# Patient Record
Sex: MALE
[De-identification: names, ages, dates, MRNs, and addresses within clinical notes are randomized per-mention and may not be internally consistent; named-entity substitution may affect disease eponyms.]

---

## 2019-03-01 ENCOUNTER — APPOINTMENT (OUTPATIENT)
Dept: INTERNAL MEDICINE | Facility: CLINIC | Age: 29
End: 2019-03-01
Payer: COMMERCIAL

## 2019-03-01 VITALS
SYSTOLIC BLOOD PRESSURE: 109 MMHG | OXYGEN SATURATION: 98 % | TEMPERATURE: 98 F | DIASTOLIC BLOOD PRESSURE: 70 MMHG | HEIGHT: 69 IN | HEART RATE: 84 BPM | BODY MASS INDEX: 16.44 KG/M2 | WEIGHT: 111 LBS

## 2019-03-01 DIAGNOSIS — Z23 ENCOUNTER FOR IMMUNIZATION: ICD-10-CM

## 2019-03-01 DIAGNOSIS — K64.4 RESIDUAL HEMORRHOIDAL SKIN TAGS: ICD-10-CM

## 2019-03-01 DIAGNOSIS — Z11.3 ENCOUNTER FOR SCREENING FOR INFECTIONS WITH A PREDOMINANTLY SEXUAL MODE OF TRANSMISSION: ICD-10-CM

## 2019-03-01 DIAGNOSIS — Z00.00 ENCOUNTER FOR GENERAL ADULT MEDICAL EXAMINATION W/OUT ABNORMAL FINDINGS: ICD-10-CM

## 2019-03-01 DIAGNOSIS — Z98.890 OTHER SPECIFIED POSTPROCEDURAL STATES: ICD-10-CM

## 2019-03-01 DIAGNOSIS — Z80.8 FAMILY HISTORY OF MALIGNANT NEOPLASM OF OTHER ORGANS OR SYSTEMS: ICD-10-CM

## 2019-03-01 DIAGNOSIS — Z78.9 OTHER SPECIFIED HEALTH STATUS: ICD-10-CM

## 2019-03-01 DIAGNOSIS — Z83.438 FAMILY HISTORY OF OTHER DISORDER OF LIPOPROTEIN METABOLISM AND OTHER LIPIDEMIA: ICD-10-CM

## 2019-03-01 DIAGNOSIS — Z87.440 PERSONAL HISTORY OF URINARY (TRACT) INFECTIONS: ICD-10-CM

## 2019-03-01 DIAGNOSIS — Z86.19 PERSONAL HISTORY OF OTHER INFECTIOUS AND PARASITIC DISEASES: ICD-10-CM

## 2019-03-01 PROCEDURE — 90686 IIV4 VACC NO PRSV 0.5 ML IM: CPT

## 2019-03-01 PROCEDURE — G0008: CPT

## 2019-03-01 PROCEDURE — 36415 COLL VENOUS BLD VENIPUNCTURE: CPT

## 2019-03-01 PROCEDURE — 99385 PREV VISIT NEW AGE 18-39: CPT | Mod: 25

## 2019-03-01 NOTE — HISTORY OF PRESENT ILLNESS
[FreeTextEntry1] : est care, annual exam [de-identified] : STD screening\par - sexually active, h/o gonorrhea and ? genital warts\par - would like to resume PreP once screening labs returned\par \par abdominal pain\par - right flank/RUQ pain, CT abdomen was normal\par - pain radiated up chest and flank, described as burning\par - was evaluated by GI, underwent colonoscopy bx results pending\par \par HCM\par - need flu vaccine

## 2019-03-01 NOTE — HEALTH RISK ASSESSMENT
[Excellent] : ~his/her~  mood as  excellent [] : No [No falls in past year] : Patient reported no falls in the past year [0] : 2) Feeling down, depressed, or hopeless: Not at all (0) [[id number]] : 0 [Change in mental status noted] : No change in mental status noted [Language] : denies difficulty with language [Behavior] : denies difficulty with behavior [Learning/Retaining New Information] : denies difficulty learning/retaining new information [Handling Complex Tasks] : denies difficulty handling complex tasks [Reasoning] : denies difficulty with reasoning [Spatial Ability and Orientation] : denies difficulty with spatial ability and orientation [None] : None [Alone] : lives alone [Employed] : employed [] :  [# Of Children ___] : has [unfilled] children [Sexually Active] : sexually active [High Risk Behavior] : high risk behavior [Fully functional (bathing, dressing, toileting, transferring, walking, feeding)] : Fully functional (bathing, dressing, toileting, transferring, walking, feeding) [Fully functional (using the telephone, shopping, preparing meals, housekeeping, doing laundry, using] : Fully functional and needs no help or supervision to perform IADLs (using the telephone, shopping, preparing meals, housekeeping, doing laundry, using transportation, managing medications and managing finances) [Reports changes in hearing] : Reports no changes in hearing [Reports changes in vision] : Reports no changes in vision [Reports normal functional visual acuity (ie: able to read med bottle)] : Reports normal functional visual acuity [With Patient/Caregiver] : With Patient/Caregiver [Designated Healthcare Proxy] : Designated healthcare proxy [AdvancecareDate] : 03/01/2019

## 2019-03-04 ENCOUNTER — TRANSCRIPTION ENCOUNTER (OUTPATIENT)
Age: 29
End: 2019-03-04

## 2019-03-05 LAB
25(OH)D3 SERPL-MCNC: 12 NG/ML
ALBUMIN SERPL ELPH-MCNC: 5.1 G/DL
ALP BLD-CCNC: 83 U/L
ALT SERPL-CCNC: 16 U/L
ANION GAP SERPL CALC-SCNC: 12 MMOL/L
APPEARANCE: CLEAR
AST SERPL-CCNC: 26 U/L
BASOPHILS # BLD AUTO: 0.06 K/UL
BASOPHILS NFR BLD AUTO: 1.3 %
BILIRUB SERPL-MCNC: 0.9 MG/DL
BILIRUBIN URINE: NEGATIVE
BLOOD URINE: NEGATIVE
BUN SERPL-MCNC: 12 MG/DL
C TRACH RRNA SPEC QL NAA+PROBE: NOT DETECTED
CALCIUM SERPL-MCNC: 9.8 MG/DL
CHLORIDE SERPL-SCNC: 103 MMOL/L
CHOLEST SERPL-MCNC: 163 MG/DL
CHOLEST/HDLC SERPL: 2.4 RATIO
CO2 SERPL-SCNC: 28 MMOL/L
COLOR: YELLOW
CREAT SERPL-MCNC: 0.83 MG/DL
EOSINOPHIL # BLD AUTO: 0.4 K/UL
EOSINOPHIL NFR BLD AUTO: 9 %
GLUCOSE QUALITATIVE U: NEGATIVE
GLUCOSE SERPL-MCNC: 74 MG/DL
HBA1C MFR BLD HPLC: 5.2 %
HCT VFR BLD CALC: 48.5 %
HCV AB SER QL: NONREACTIVE
HCV S/CO RATIO: 0.08 S/CO
HDLC SERPL-MCNC: 67 MG/DL
HGB BLD-MCNC: 14.6 G/DL
HIV1+2 AB SPEC QL IA.RAPID: NONREACTIVE
HSV 1+2 IGG SER IA-IMP: NEGATIVE
HSV 1+2 IGG SER IA-IMP: NEGATIVE
HSV1 IGG SER QL: <0.01 INDEX
HSV2 IGG SER QL: 0.08 INDEX
IMM GRANULOCYTES NFR BLD AUTO: 0.4 %
KETONES URINE: NEGATIVE
LDLC SERPL CALC-MCNC: 88 MG/DL
LEUKOCYTE ESTERASE URINE: NEGATIVE
LYMPHOCYTES # BLD AUTO: 1.26 K/UL
LYMPHOCYTES NFR BLD AUTO: 28.3 %
MAN DIFF?: NORMAL
MCHC RBC-ENTMCNC: 26.3 PG
MCHC RBC-ENTMCNC: 30.1 GM/DL
MCV RBC AUTO: 87.2 FL
MONOCYTES # BLD AUTO: 0.38 K/UL
MONOCYTES NFR BLD AUTO: 8.5 %
N GONORRHOEA RRNA SPEC QL NAA+PROBE: NOT DETECTED
NEUTROPHILS # BLD AUTO: 2.34 K/UL
NEUTROPHILS NFR BLD AUTO: 52.5 %
NITRITE URINE: NEGATIVE
PH URINE: 7.5
PLATELET # BLD AUTO: 235 K/UL
POTASSIUM SERPL-SCNC: 4.6 MMOL/L
PROT SERPL-MCNC: 7 G/DL
PROTEIN URINE: NEGATIVE
PSA SERPL-MCNC: 0.52 NG/ML
RBC # BLD: 5.56 M/UL
RBC # FLD: 12.8 %
SODIUM SERPL-SCNC: 143 MMOL/L
SOURCE AMPLIFICATION: NORMAL
SPECIFIC GRAVITY URINE: 1.02
T PALLIDUM AB SER QL IA: NEGATIVE
T4 FREE SERPL-MCNC: 1.4 NG/DL
TRIGL SERPL-MCNC: 42 MG/DL
TSH SERPL-ACNC: 1.72 UIU/ML
UROBILINOGEN URINE: NORMAL
WBC # FLD AUTO: 4.46 K/UL

## 2019-04-22 ENCOUNTER — APPOINTMENT (OUTPATIENT)
Dept: INTERNAL MEDICINE | Facility: CLINIC | Age: 29
End: 2019-04-22
Payer: MEDICAID

## 2019-04-22 VITALS
DIASTOLIC BLOOD PRESSURE: 70 MMHG | WEIGHT: 112 LBS | SYSTOLIC BLOOD PRESSURE: 102 MMHG | TEMPERATURE: 97.7 F | OXYGEN SATURATION: 99 % | HEART RATE: 83 BPM | HEIGHT: 69 IN | BODY MASS INDEX: 16.59 KG/M2

## 2019-04-22 DIAGNOSIS — Z11.3 ENCOUNTER FOR SCREENING FOR INFECTIONS WITH A PREDOMINANTLY SEXUAL MODE OF TRANSMISSION: ICD-10-CM

## 2019-04-22 DIAGNOSIS — E55.9 VITAMIN D DEFICIENCY, UNSPECIFIED: ICD-10-CM

## 2019-04-22 DIAGNOSIS — J45.30 MILD PERSISTENT ASTHMA, UNCOMPLICATED: ICD-10-CM

## 2019-04-22 DIAGNOSIS — R51 HEADACHE: ICD-10-CM

## 2019-04-22 PROCEDURE — 99213 OFFICE O/P EST LOW 20 MIN: CPT | Mod: 25

## 2019-04-22 PROCEDURE — 36415 COLL VENOUS BLD VENIPUNCTURE: CPT

## 2019-04-22 NOTE — HISTORY OF PRESENT ILLNESS
[FreeTextEntry1] : follow up [de-identified] : concerned will lose insurance this month\par - needs medication refills\par \par PreP\par - needs STD screening and refill\par \par Vit D def\par - needs refill\par \par scalp pain\par - over vertex of scalp feels like something inside\par - h/o trauma x 2 over the past year, bumped head on freezer door and on cabinet door\par - no lesions on scalp no change in vision or migraines

## 2019-04-22 NOTE — PHYSICAL EXAM
[No Acute Distress] : no acute distress [Well Developed] : well developed [Well Nourished] : well nourished [Well-Appearing] : well-appearing [EOMI] : extraocular movements intact [Normal Sclera/Conjunctiva] : normal sclera/conjunctiva [No Respiratory Distress] : no respiratory distress  [Normal Rate] : normal rate  [Normal Gait] : normal gait [Normal Affect] : the affect was normal [Normal Mood] : the mood was normal [Alert and Oriented x3] : oriented to person, place, and time [Normal Insight/Judgement] : insight and judgment were intact [de-identified] : scalp normal on exam

## 2019-04-22 NOTE — HEALTH RISK ASSESSMENT
[] : No [No falls in past year] : Patient reported no falls in the past year [0] : 2) Feeling down, depressed, or hopeless: Not at all (0) [[id number]] : 0

## 2019-04-24 LAB
25(OH)D3 SERPL-MCNC: 50.8 NG/ML
C TRACH RRNA SPEC QL NAA+PROBE: NOT DETECTED
HIV1+2 AB SPEC QL IA.RAPID: NONREACTIVE
HSV 1+2 IGG SER IA-IMP: NEGATIVE
HSV 1+2 IGG SER IA-IMP: NEGATIVE
HSV1 IGG SER QL: <0.01 INDEX
HSV2 IGG SER QL: 0.11 INDEX
N GONORRHOEA RRNA SPEC QL NAA+PROBE: NOT DETECTED
SOURCE AMPLIFICATION: NORMAL
T PALLIDUM AB SER QL IA: NEGATIVE
VIT B12 SERPL-MCNC: 597 PG/ML

## 2019-04-28 ENCOUNTER — FORM ENCOUNTER (OUTPATIENT)
Age: 29
End: 2019-04-28

## 2019-04-29 ENCOUNTER — APPOINTMENT (OUTPATIENT)
Dept: MRI IMAGING | Facility: CLINIC | Age: 29
End: 2019-04-29
Payer: MEDICAID

## 2019-04-29 ENCOUNTER — OUTPATIENT (OUTPATIENT)
Dept: OUTPATIENT SERVICES | Facility: HOSPITAL | Age: 29
LOS: 1 days | End: 2019-04-29

## 2019-04-29 PROCEDURE — 70551 MRI BRAIN STEM W/O DYE: CPT | Mod: 26

## 2019-05-06 ENCOUNTER — MEDICATION RENEWAL (OUTPATIENT)
Age: 29
End: 2019-05-06

## 2019-05-06 RX ORDER — ERGOCALCIFEROL 1.25 MG/1
1.25 MG CAPSULE, LIQUID FILLED ORAL
Qty: 12 | Refills: 3 | Status: ACTIVE | COMMUNITY
Start: 2019-03-05 | End: 1900-01-01

## 2019-05-06 RX ORDER — ALBUTEROL SULFATE 90 UG/1
108 (90 BASE) AEROSOL, METERED RESPIRATORY (INHALATION)
Qty: 3 | Refills: 3 | Status: ACTIVE | COMMUNITY
Start: 1900-01-01 | End: 1900-01-01

## 2019-07-18 ENCOUNTER — APPOINTMENT (OUTPATIENT)
Dept: INTERNAL MEDICINE | Facility: CLINIC | Age: 29
End: 2019-07-18
Payer: MEDICAID

## 2019-07-18 VITALS
OXYGEN SATURATION: 98 % | SYSTOLIC BLOOD PRESSURE: 102 MMHG | DIASTOLIC BLOOD PRESSURE: 70 MMHG | WEIGHT: 112 LBS | BODY MASS INDEX: 16.59 KG/M2 | HEIGHT: 69 IN | TEMPERATURE: 98 F | HEART RATE: 81 BPM

## 2019-07-18 DIAGNOSIS — Z72.51 HIGH RISK HETEROSEXUAL BEHAVIOR: ICD-10-CM

## 2019-07-18 DIAGNOSIS — R30.0 DYSURIA: ICD-10-CM

## 2019-07-18 PROCEDURE — 99214 OFFICE O/P EST MOD 30 MIN: CPT | Mod: 25

## 2019-07-18 PROCEDURE — 36415 COLL VENOUS BLD VENIPUNCTURE: CPT

## 2019-07-18 NOTE — PHYSICAL EXAM
[No Acute Distress] : no acute distress [Well Nourished] : well nourished [Well Developed] : well developed [No Respiratory Distress] : no respiratory distress  [de-identified] : left trapezius spasm, decreased internal and external rotation of left shoulder, ? laxity of shoulder joint w/ external pressure.

## 2019-07-18 NOTE — HISTORY OF PRESENT ILLNESS
[FreeTextEntry8] : left shoulder pain\par - believes he injured it while duing push ups\par - pain radiates up his neck and down his left arm\par - left arm is not as flexible as right arm\par - pt is very flexible at baseline\par - has not tried any OTC medications for pain\par - has never had imaging\par \par Dysuria\par - c/o urethral discomfort\par - woke him up from sleep\par - no meatal discharge, fever or chills\par - compliant w/ truvada, needs screening\par - concerned about long term side effects from medication.

## 2019-07-19 ENCOUNTER — MEDICATION RENEWAL (OUTPATIENT)
Age: 29
End: 2019-07-19

## 2019-07-19 RX ORDER — EMTRICITABINE AND TENOFOVIR DISOPROXIL FUMARATE 200; 300 MG/1; MG/1
200-300 TABLET, FILM COATED ORAL DAILY
Qty: 30 | Refills: 3 | Status: ACTIVE | COMMUNITY
Start: 2019-03-05 | End: 1900-01-01

## 2019-07-22 LAB
APPEARANCE: CLEAR
BACTERIA UR CULT: NORMAL
BILIRUBIN URINE: NEGATIVE
BLOOD URINE: NEGATIVE
C TRACH RRNA SPEC QL NAA+PROBE: NOT DETECTED
COLOR: YELLOW
GLUCOSE QUALITATIVE U: NEGATIVE
HIV1+2 AB SPEC QL IA.RAPID: NONREACTIVE
KETONES URINE: NORMAL
LEUKOCYTE ESTERASE URINE: NEGATIVE
N GONORRHOEA RRNA SPEC QL NAA+PROBE: NOT DETECTED
NITRITE URINE: NEGATIVE
PH URINE: 6.5
PROTEIN URINE: NORMAL
SOURCE AMPLIFICATION: NORMAL
SPECIFIC GRAVITY URINE: 1.03
T PALLIDUM AB SER QL IA: NEGATIVE
UROBILINOGEN URINE: NORMAL

## 2019-07-24 ENCOUNTER — FORM ENCOUNTER (OUTPATIENT)
Age: 29
End: 2019-07-24

## 2019-07-24 ENCOUNTER — OUTPATIENT (OUTPATIENT)
Dept: OUTPATIENT SERVICES | Facility: HOSPITAL | Age: 29
LOS: 1 days | End: 2019-07-24

## 2019-07-24 ENCOUNTER — APPOINTMENT (OUTPATIENT)
Dept: MRI IMAGING | Facility: CLINIC | Age: 29
End: 2019-07-24
Payer: MEDICAID

## 2019-07-25 PROCEDURE — 73221 MRI JOINT UPR EXTREM W/O DYE: CPT | Mod: 26,LT

## 2019-08-15 ENCOUNTER — LABORATORY RESULT (OUTPATIENT)
Age: 29
End: 2019-08-15

## 2019-08-15 ENCOUNTER — MEDICATION RENEWAL (OUTPATIENT)
Age: 29
End: 2019-08-15

## 2019-08-15 ENCOUNTER — APPOINTMENT (OUTPATIENT)
Dept: INTERNAL MEDICINE | Facility: CLINIC | Age: 29
End: 2019-08-15
Payer: MEDICAID

## 2019-08-15 VITALS
BODY MASS INDEX: 16.39 KG/M2 | DIASTOLIC BLOOD PRESSURE: 71 MMHG | SYSTOLIC BLOOD PRESSURE: 106 MMHG | WEIGHT: 111 LBS | HEART RATE: 113 BPM | OXYGEN SATURATION: 98 %

## 2019-08-15 DIAGNOSIS — M25.512 PAIN IN LEFT SHOULDER: ICD-10-CM

## 2019-08-15 PROCEDURE — 99214 OFFICE O/P EST MOD 30 MIN: CPT | Mod: 25

## 2019-08-15 PROCEDURE — 36415 COLL VENOUS BLD VENIPUNCTURE: CPT

## 2019-08-15 RX ORDER — MELOXICAM 15 MG/1
15 TABLET ORAL
Qty: 30 | Refills: 0 | Status: ACTIVE | COMMUNITY
Start: 2019-07-18 | End: 1900-01-01

## 2019-08-15 NOTE — HISTORY OF PRESENT ILLNESS
[FreeTextEntry1] : joint pains [de-identified] : joint pains\par - c/o continued left shoulder pain\par - MRI left shoulder normal 7/25/19\par - also noted b/l Achilles tendon pain at heel insertion\par - no wrist or finger pains\par - right shoulder started to bother him as well, may be from favoring it over left shoulder\par - mother has fibromyalgia, concerned he may have it also.

## 2019-08-15 NOTE — PHYSICAL EXAM
[No Acute Distress] : no acute distress [Well Nourished] : well nourished [Well Developed] : well developed [EOMI] : extraocular movements intact [No Respiratory Distress] : no respiratory distress  [No Rash] : no rash [No Edema] : there was no peripheral edema [Alert and Oriented x3] : oriented to person, place, and time [Normal Affect] : the affect was normal [Normal Mood] : the mood was normal [Normal Insight/Judgement] : insight and judgment were intact [de-identified] : b/l tenderness over achilles tendon insertion into heel

## 2019-08-16 ENCOUNTER — TRANSCRIPTION ENCOUNTER (OUTPATIENT)
Age: 29
End: 2019-08-16

## 2019-08-16 LAB
CK SERPL-CCNC: 108 U/L
ERYTHROCYTE [SEDIMENTATION RATE] IN BLOOD BY WESTERGREN METHOD: 2 MM/HR
RHEUMATOID FACT SER QL: 245 IU/ML
URATE SERPL-MCNC: 4.5 MG/DL

## 2019-08-19 LAB
CCP AB SER IA-ACNC: 16 UNITS
MPO AB + PR3 PNL SER: NORMAL
RF+CCP IGG SER-IMP: NEGATIVE

## 2019-08-20 LAB — ANA SER IF-ACNC: NEGATIVE

## 2019-08-21 LAB — ALDOLASE SERPL-CCNC: 6.5 U/L

## 2019-08-23 ENCOUNTER — APPOINTMENT (OUTPATIENT)
Dept: RHEUMATOLOGY | Facility: CLINIC | Age: 29
End: 2019-08-23
Payer: MEDICAID

## 2019-08-23 ENCOUNTER — OUTPATIENT (OUTPATIENT)
Dept: OUTPATIENT SERVICES | Facility: HOSPITAL | Age: 29
LOS: 1 days | End: 2019-08-23

## 2019-08-23 ENCOUNTER — LABORATORY RESULT (OUTPATIENT)
Age: 29
End: 2019-08-23

## 2019-08-23 ENCOUNTER — APPOINTMENT (OUTPATIENT)
Dept: RADIOLOGY | Facility: CLINIC | Age: 29
End: 2019-08-23
Payer: MEDICAID

## 2019-08-23 VITALS
HEART RATE: 84 BPM | WEIGHT: 111 LBS | OXYGEN SATURATION: 99 % | BODY MASS INDEX: 16.44 KG/M2 | DIASTOLIC BLOOD PRESSURE: 82 MMHG | HEIGHT: 69 IN | TEMPERATURE: 98.1 F | SYSTOLIC BLOOD PRESSURE: 115 MMHG

## 2019-08-23 DIAGNOSIS — R53.83 OTHER FATIGUE: ICD-10-CM

## 2019-08-23 PROCEDURE — 73130 X-RAY EXAM OF HAND: CPT | Mod: 26,LT,RT

## 2019-08-23 PROCEDURE — 73110 X-RAY EXAM OF WRIST: CPT | Mod: 26,LT,RT

## 2019-08-23 PROCEDURE — 99205 OFFICE O/P NEW HI 60 MIN: CPT | Mod: 25

## 2019-08-23 PROCEDURE — 36415 COLL VENOUS BLD VENIPUNCTURE: CPT

## 2019-08-25 LAB
B BURGDOR AB SER-IMP: NEGATIVE
B BURGDOR IGG+IGM SER QL: 0.11 INDEX
CRP SERPL-MCNC: <0.1 MG/DL
ENDOMYSIUM IGA SER QL: NEGATIVE
ENDOMYSIUM IGA TITR SER: NORMAL
FERRITIN SERPL-MCNC: 185 NG/ML
IGA SER QL IEP: 131 MG/DL

## 2019-08-26 LAB
A PHAGOCYTOPH IGG TITR SER IF: NORMAL TITER
ALBUMIN MFR SERPL ELPH: 66.8 %
ALBUMIN SERPL-MCNC: 4.8 G/DL
ALBUMIN/GLOB SERPL: 2 RATIO
ALBUPE: 8.9 %
ALPHA1 GLOB MFR SERPL ELPH: 3.1 %
ALPHA1 GLOB SERPL ELPH-MCNC: 0.2 G/DL
ALPHA1UPE: 23.1 %
ALPHA2 GLOB MFR SERPL ELPH: 8.9 %
ALPHA2 GLOB SERPL ELPH-MCNC: 0.6 G/DL
ALPHA2UPE: 15.1 %
B BURGDOR AB SER QL IA: NEGATIVE
B MICROTI IGG TITR SER: NORMAL TITER
B-GLOBULIN MFR SERPL ELPH: 9.6 %
B-GLOBULIN SERPL ELPH-MCNC: 0.7 G/DL
BETAUPE: 11 %
DEPRECATED KAPPA LC FREE/LAMBDA SER: 1.12 RATIO
E CHAFFEENSIS IGG TITR SER IF: NORMAL TITER
GAMMA GLOB FLD ELPH-MCNC: 0.8 G/DL
GAMMA GLOB MFR SERPL ELPH: 11.6 %
GAMMAUPE: 41.9 %
IGA 24H UR QL IFE: NORMAL
IGA SER QL IEP: 133 MG/DL
IGG SER QL IEP: 800 MG/DL
IGM SER QL IEP: 206 MG/DL
INTERPRETATION SERPL IEP-IMP: NORMAL
KAPPA LC 24H UR QL: NORMAL
KAPPA LC CSF-MCNC: 0.73 MG/DL
KAPPA LC SERPL-MCNC: 0.82 MG/DL
M PROTEIN SPEC IFE-MCNC: NORMAL
PROT PATTERN 24H UR ELPH-IMP: NORMAL
PROT SERPL-MCNC: 7.2 G/DL
PROT SERPL-MCNC: 7.2 G/DL
PROT UR-MCNC: 5 MG/DL
PROT UR-MCNC: 6 MG/DL

## 2019-08-26 NOTE — ASSESSMENT
[FreeTextEntry1] : 27 y/o M with anxiety and asthma,   b/l shoulder pain feels more burning sensation , recent shoulder MRI normal,  knee pain since childhood,hypermobile joints since childhood without dislocation of the joints  no easy bruising or skin laxity, Treva's tendinitis  presents for Rheum consultation as recently found strongly positive RF,  exam except hypertensive elbows no evidence of small or large joint synovitis or limited ROM, no AM stiffness or skin nodules, \par At present low suspicious for RA or any underlying autoimmune disease. \par I explained pt that RF is the non specific test and \par \par 1. will check additional labs such as CRP, tick disease serology, SPEP/UPEP with IF , celiac serology \par 2. Xrays of hand/wrist and US looking for underlying active synovitis, tenosynovitis or erosions if negative work up most likely will monitor pt once a year or prn and watch for developing specific RA symptoms such as small or large joint swelling or stiffness. \par \par f/u in 2 weeks to review labs and images.

## 2019-08-26 NOTE — HISTORY OF PRESENT ILLNESS
[Fatigue] : fatigue [Arthralgias] : arthralgias [Difficulty Walking] : difficulty walking [Myalgias] : myalgias [Muscle Spasms] : muscle spasms [FreeTextEntry1] : Pt referred by Dr. Herron for Rheumatology consultation \par 27 y/o M with anxiety and asthma,   b/l shoulder pain feels more burning sensation , recent shoulder MRI normal,  knee pain since childhood,hypermobile joints since childhood without dislocation of the joints  no easy bruising or skin laxity, Treva's tendinitis 2016 and it took 1 year to improve. He was a dancer  stopped\par 2 years ago because of tendinitis,  b/l sudden onset hand pain for 2-3 weeks and resolved on it's own.\par Takes Meloxicam for pain control.  \par AD since childhood. \par Insomnia. \par Mother with fibromyalgia \par Denies low back pain. \par Mild gastritis: had EGD and colonoscopy.  \par He was found to have strongly +  with normal inflammatory markers and normal CRP. \par No h/o join swelling , morning stiffness, memory loss, patchy hair loss, sicca symptoms, photosensitivity, HA,  Raynaud's, oral ulcers, nasal ulcers. \par Personal or family hx of autoimmune disease, no hx of psoriasis\par  [Anorexia] : no anorexia [Weight Loss] : no weight loss [Malaise] : no malaise [Fever] : no fever [Chills] : no chills [Depression] : no depression [Malar Facial Rash] : no malar facial rash [Skin Lesions] : no lesions [Skin Nodules] : no skin nodules [Oral Ulcers] : no oral ulcers [Cough] : no cough [Dry Mouth] : no dry mouth [Dysphonia] : no dysphonia [Dysphagia] : no dysphagia [Chest Pain] : no chest pain [Shortness of Breath] : no shortness of breath [Joint Swelling] : no joint swelling [Joint Warmth] : no joint warmth [Joint Deformity] : no joint deformity [Decreased ROM] : no decreased range of motion [Morning Stiffness] : no morning stiffness [Falls] : no falls [Difficulty Standing] : no difficulty standing [Dyspnea] : no dyspnea [Muscle Weakness] : no muscle weakness [Visual Changes] : no visual changes [Muscle Cramping] : no muscle cramping [Eye Pain] : no eye pain [Eye Redness] : no eye redness [Dry Eyes] : no dry eyes

## 2019-08-26 NOTE — PHYSICAL EXAM
[General Appearance - In No Acute Distress] : in no acute distress [General Appearance - Alert] : alert [General Appearance - Well Nourished] : well nourished [General Appearance - Well-Appearing] : healthy appearing [General Appearance - Well Developed] : well developed [Sclera] : the sclera and conjunctiva were normal [PERRL With Normal Accommodation] : pupils were equal in size, round, and reactive to light [Examination Of The Oral Cavity] : the lips and gums were normal [Outer Ear] : the ears and nose were normal in appearance [Oropharynx] : the oropharynx was normal [Neck Appearance] : the appearance of the neck was normal [Thyroid Diffuse Enlargement] : the thyroid was not enlarged [Jugular Venous Distention Increased] : there was no jugular-venous distention [Neck Cervical Mass (___cm)] : no neck mass was observed [Auscultation Breath Sounds / Voice Sounds] : lungs were clear to auscultation bilaterally [Respiration, Rhythm And Depth] : normal respiratory rhythm and effort [Heart Rate And Rhythm] : heart rate was normal and rhythm regular [Heart Sounds] : normal S1 and S2 [Murmurs] : no murmurs [Arterial Pulses Carotid] : carotid pulses were normal with no bruits [Heart Sounds Pericardial Friction Rub] : no pericardial rub [Edema] : there was no peripheral edema [Abdomen Soft] : soft [Veins - Varicosity Changes] : there were no varicosital changes [Abdomen Tenderness] : non-tender [Cervical Lymph Nodes Enlarged Posterior Bilaterally] : posterior cervical [Cervical Lymph Nodes Enlarged Anterior Bilaterally] : anterior cervical [No CVA Tenderness] : no ~M costovertebral angle tenderness [Abnormal Walk] : normal gait [Nail Clubbing] : no clubbing  or cyanosis of the fingernails [Musculoskeletal - Swelling] : no joint swelling seen [Motor Tone] : muscle strength and tone were normal [Skin Color & Pigmentation] : normal skin color and pigmentation [Skin Turgor] : normal skin turgor [] : no rash [Skin Lesions] : no skin lesions [Motor Exam] : the motor exam was normal [Oriented To Time, Place, And Person] : oriented to person, place, and time [Affect] : the affect was normal [Mood] : the mood was normal [FreeTextEntry1] : b/l elbow with hyperextension. mild b/l tender shoulder without swelling. no other joint tenderenss or synovitis, FROM. unable to extend of the thumb to touch the flexor aspect of the forearm

## 2019-08-26 NOTE — REVIEW OF SYSTEMS
[Feeling Tired] : feeling tired [Sore Throat] : sore throat [Arthralgias] : arthralgias [Joint Pain] : joint pain [Depression] : depression [Anxiety] : anxiety [Fever] : no fever [Chills] : no chills [Feeling Poorly] : not feeling poorly [Eye Pain] : no eye pain [Red Eyes] : eyes not red [Loss Of Hearing] : no hearing loss [Earache] : no earache [Hoarseness] : no hoarseness [Heart Rate Is Slow] : the heart rate was not slow [Heart Rate Is Fast] : the heart rate was not fast [Chest Pain] : no chest pain [Palpitations] : no palpitations [Cough] : no cough [SOB on Exertion] : no shortness of breath during exertion [Heartburn] : no heartburn [Abdominal Pain] : no abdominal pain [Melena] : no melena [Skin Lesions] : no skin lesions [Skin Wound] : no skin wound [Itching] : no itching [Change In A Mole] : no change in a mole [Dizziness] : no dizziness [Fainting] : no fainting [Muscle Weakness] : no muscle weakness [Easy Bleeding] : no tendency for easy bleeding [Easy Bruising] : no tendency for easy bruising [Swollen Glands] : no swollen glands [Swollen Glands In The Neck] : no swollen glands in the neck

## 2019-08-27 LAB
TTG IGA SER IA-ACNC: <1.2 U/ML
TTG IGA SER-ACNC: NEGATIVE
TTG IGG SER IA-ACNC: <1.2 U/ML
TTG IGG SER IA-ACNC: NEGATIVE

## 2019-09-06 LAB

## 2019-09-12 ENCOUNTER — APPOINTMENT (OUTPATIENT)
Dept: ULTRASOUND IMAGING | Facility: CLINIC | Age: 29
End: 2019-09-12
Payer: MEDICAID

## 2019-09-12 ENCOUNTER — OUTPATIENT (OUTPATIENT)
Dept: OUTPATIENT SERVICES | Facility: HOSPITAL | Age: 29
LOS: 1 days | End: 2019-09-12

## 2019-09-12 PROCEDURE — 76881 US COMPL JOINT R-T W/IMG: CPT | Mod: 26,RT

## 2019-09-12 PROCEDURE — 76881 US COMPL JOINT R-T W/IMG: CPT | Mod: 26,LT,76

## 2019-09-17 ENCOUNTER — APPOINTMENT (OUTPATIENT)
Dept: RHEUMATOLOGY | Facility: CLINIC | Age: 29
End: 2019-09-17
Payer: MEDICAID

## 2019-09-17 ENCOUNTER — APPOINTMENT (OUTPATIENT)
Dept: RADIOLOGY | Facility: CLINIC | Age: 29
End: 2019-09-17
Payer: MEDICAID

## 2019-09-17 ENCOUNTER — OUTPATIENT (OUTPATIENT)
Dept: OUTPATIENT SERVICES | Facility: HOSPITAL | Age: 29
LOS: 1 days | End: 2019-09-17

## 2019-09-17 VITALS
SYSTOLIC BLOOD PRESSURE: 104 MMHG | OXYGEN SATURATION: 100 % | TEMPERATURE: 98.3 F | DIASTOLIC BLOOD PRESSURE: 69 MMHG | WEIGHT: 111 LBS | HEART RATE: 66 BPM | HEIGHT: 69 IN | BODY MASS INDEX: 16.44 KG/M2

## 2019-09-17 DIAGNOSIS — M25.50 PAIN IN UNSPECIFIED JOINT: ICD-10-CM

## 2019-09-17 DIAGNOSIS — R76.8 OTHER SPECIFIED ABNORMAL IMMUNOLOGICAL FINDINGS IN SERUM: ICD-10-CM

## 2019-09-17 DIAGNOSIS — G89.29 PAIN IN RIGHT FOOT: ICD-10-CM

## 2019-09-17 DIAGNOSIS — M79.671 PAIN IN RIGHT FOOT: ICD-10-CM

## 2019-09-17 DIAGNOSIS — M79.672 PAIN IN RIGHT FOOT: ICD-10-CM

## 2019-09-17 PROCEDURE — 99214 OFFICE O/P EST MOD 30 MIN: CPT

## 2019-09-17 PROCEDURE — 73630 X-RAY EXAM OF FOOT: CPT | Mod: 26,LT,RT

## 2019-09-17 NOTE — PHYSICAL EXAM
[General Appearance - In No Acute Distress] : in no acute distress [General Appearance - Alert] : alert [General Appearance - Well Developed] : well developed [General Appearance - Well Nourished] : well nourished [Sclera] : the sclera and conjunctiva were normal [General Appearance - Well-Appearing] : healthy appearing [PERRL With Normal Accommodation] : pupils were equal in size, round, and reactive to light [Outer Ear] : the ears and nose were normal in appearance [Neck Appearance] : the appearance of the neck was normal [Oropharynx] : the oropharynx was normal [Examination Of The Oral Cavity] : the lips and gums were normal [Neck Cervical Mass (___cm)] : no neck mass was observed [Jugular Venous Distention Increased] : there was no jugular-venous distention [Thyroid Diffuse Enlargement] : the thyroid was not enlarged [Respiration, Rhythm And Depth] : normal respiratory rhythm and effort [Auscultation Breath Sounds / Voice Sounds] : lungs were clear to auscultation bilaterally [Heart Sounds] : normal S1 and S2 [Heart Rate And Rhythm] : heart rate was normal and rhythm regular [Murmurs] : no murmurs [Heart Sounds Pericardial Friction Rub] : no pericardial rub [Arterial Pulses Carotid] : carotid pulses were normal with no bruits [Edema] : there was no peripheral edema [Abdomen Soft] : soft [Veins - Varicosity Changes] : there were no varicosital changes [Cervical Lymph Nodes Enlarged Posterior Bilaterally] : posterior cervical [Abdomen Tenderness] : non-tender [Cervical Lymph Nodes Enlarged Anterior Bilaterally] : anterior cervical [No CVA Tenderness] : no ~M costovertebral angle tenderness [Abnormal Walk] : normal gait [Musculoskeletal - Swelling] : no joint swelling seen [Nail Clubbing] : no clubbing  or cyanosis of the fingernails [Motor Tone] : muscle strength and tone were normal [Skin Color & Pigmentation] : normal skin color and pigmentation [] : no rash [Skin Turgor] : normal skin turgor [Motor Exam] : the motor exam was normal [Skin Lesions] : no skin lesions [Mood] : the mood was normal [Oriented To Time, Place, And Person] : oriented to person, place, and time [Affect] : the affect was normal [FreeTextEntry1] : b/l elbow with hyperextension. mild b/l tender shoulder without swelling. no other joint tenderenss or synovitis, FROM. unable to extend of the thumb to touch the flexor aspect of the forearm

## 2019-09-17 NOTE — ASSESSMENT
[FreeTextEntry1] : 29 y/o M with anxiety and asthma,   b/l shoulder pain feels more burning sensation , recent shoulder MRI normal,  knee pain since childhood,hypermobile joints since childhood without dislocation of the joints  no easy bruising or skin laxity, Treva's tendinitis,  has strongly positive RF,  exam except hypertensive elbows no evidence of small or large joint synovitis or limited ROM, no AM stiffness or skin nodules, hand and writs XRay and [facility] hand/wrist US negative for synovitis, tenosynovitis to suspect inflammatory arthritis/RA\par Has negative CCP and inflammatory markers such as ESR and CRP, at present pt does not fit inflammatory or RA diagnostic criteria and most likely + RF is nonspecific, however I educated pt about monitoring his symptoms and he should come back every 6 month or earlier if any new symptoms such as joint swelling, AM stiffness. \par Will obtain b/l foot xrays given his chronic pain/discomfort affecting left 5th more than right 5th toe  to rule out possibility of RA raled changes. \par \par f/u in 6 months or earlier.

## 2019-09-17 NOTE — REVIEW OF SYSTEMS
[Feeling Tired] : feeling tired [Arthralgias] : arthralgias [Joint Pain] : joint pain [Depression] : depression [Anxiety] : anxiety [Fever] : no fever [Chills] : no chills [Eye Pain] : no eye pain [Feeling Poorly] : not feeling poorly [Earache] : no earache [Red Eyes] : eyes not red [Sore Throat] : no sore throat [Loss Of Hearing] : no hearing loss [Hoarseness] : no hoarseness [Heart Rate Is Fast] : the heart rate was not fast [Heart Rate Is Slow] : the heart rate was not slow [Chest Pain] : no chest pain [Palpitations] : no palpitations [SOB on Exertion] : no shortness of breath during exertion [Cough] : no cough [Abdominal Pain] : no abdominal pain [Heartburn] : no heartburn [Melena] : no melena [Skin Lesions] : no skin lesions [Skin Wound] : no skin wound [Itching] : no itching [Change In A Mole] : no change in a mole [Dizziness] : no dizziness [Muscle Weakness] : no muscle weakness [Fainting] : no fainting [Easy Bleeding] : no tendency for easy bleeding [Swollen Glands] : no swollen glands [Easy Bruising] : no tendency for easy bruising [Swollen Glands In The Neck] : no swollen glands in the neck

## 2019-09-17 NOTE — HISTORY OF PRESENT ILLNESS
[Fatigue] : fatigue [Arthralgias] : arthralgias [Difficulty Walking] : difficulty walking [Myalgias] : myalgias [Muscle Spasms] : muscle spasms [___ Week(s) Ago] : [unfilled] week(s) ago [FreeTextEntry1] : Follow up: 9/17/19 \par strongly + RF, has no arthritis on exam and Xray and [facility] US with no active infammation: pt does not fit criteria of RA at present. \par Overall doing well, intermittently has left shoulder pain and chronic left 5th toe discomfort without swelling of the joints. \par Does not take Meloxicam as states pain is not bad enough to require pain control. \par \par \par Pt referred by Dr. Herron for Rheumatology consultation \par 29 y/o M with anxiety and asthma,   b/l shoulder pain feels more burning sensation , recent shoulder MRI normal,  knee pain since childhood,hypermobile joints since childhood without dislocation of the joints  no easy bruising or skin laxity, Montague's tendinitis 2016 and it took 1 year to improve. He was a dancer  stopped\par 2 years ago because of tendinitis,  b/l sudden onset hand pain for 2-3 weeks and resolved on it's own.\par Takes Meloxicam for pain control.  \par AD since childhood. \par Insomnia. \par Mother with fibromyalgia \par Denies low back pain. \par Mild gastritis: had EGD and colonoscopy.  \par He was found to have strongly +  with normal inflammatory markers and normal CRP. \par No h/o join swelling , morning stiffness, memory loss, patchy hair loss, sicca symptoms, photosensitivity, HA,  Raynaud's, oral ulcers, nasal ulcers. \par Personal or family hx of autoimmune disease, no hx of psoriasis\par  [Anorexia] : no anorexia [Weight Loss] : no weight loss [Malaise] : no malaise [Fever] : no fever [Chills] : no chills [Depression] : no depression [Malar Facial Rash] : no malar facial rash [Skin Lesions] : no lesions [Skin Nodules] : no skin nodules [Oral Ulcers] : no oral ulcers [Cough] : no cough [Dry Mouth] : no dry mouth [Dysphonia] : no dysphonia [Dysphagia] : no dysphagia [Shortness of Breath] : no shortness of breath [Chest Pain] : no chest pain [Joint Swelling] : no joint swelling [Joint Warmth] : no joint warmth [Joint Deformity] : no joint deformity [Decreased ROM] : no decreased range of motion [Morning Stiffness] : no morning stiffness [Falls] : no falls [Difficulty Standing] : no difficulty standing [Dyspnea] : no dyspnea [Muscle Weakness] : no muscle weakness [Muscle Cramping] : no muscle cramping [Eye Pain] : no eye pain [Visual Changes] : no visual changes [Dry Eyes] : no dry eyes [Eye Redness] : no eye redness